# Patient Record
Sex: MALE | Employment: OTHER
[De-identification: names, ages, dates, MRNs, and addresses within clinical notes are randomized per-mention and may not be internally consistent; named-entity substitution may affect disease eponyms.]

---

## 2017-02-10 ENCOUNTER — FOLLOW UP (OUTPATIENT)
Age: 82
End: 2017-02-10

## 2017-02-13 ASSESSMENT — VISUAL ACUITY
OS_SC: 20/100-2
OD_SC: 20/30+2

## 2017-02-13 ASSESSMENT — TONOMETRY: OS_IOP_MMHG: 9

## 2017-05-12 ENCOUNTER — FOLLOW UP (OUTPATIENT)
Age: 82
End: 2017-05-12

## 2017-05-18 ASSESSMENT — VISUAL ACUITY
OS_SC: 20/100-1
OD_SC: 20/30-1

## 2017-05-18 ASSESSMENT — TONOMETRY
OS_IOP_MMHG: 12
OD_IOP_MMHG: 13

## 2017-08-18 ENCOUNTER — FOLLOW UP (OUTPATIENT)
Age: 82
End: 2017-08-18

## 2017-08-21 ASSESSMENT — TONOMETRY
OD_IOP_MMHG: 9
OS_IOP_MMHG: 8

## 2017-08-21 ASSESSMENT — VISUAL ACUITY
OD_SC: 20/30-2
OS_SC: 20/400

## 2017-11-10 ENCOUNTER — FOLLOW UP (OUTPATIENT)
Age: 82
End: 2017-11-10

## 2017-11-13 ASSESSMENT — VISUAL ACUITY
OD_SC: 20/40+2
OS_SC: 20/400

## 2017-11-13 ASSESSMENT — TONOMETRY
OS_IOP_MMHG: 17
OD_IOP_MMHG: 16

## 2018-03-16 ENCOUNTER — FOLLOW UP (OUTPATIENT)
Age: 83
End: 2018-03-16

## 2018-03-19 ASSESSMENT — VISUAL ACUITY
OS_SC: 20/400
OD_SC: 20/40
OD_CC: 20/30-1

## 2018-03-19 ASSESSMENT — TONOMETRY
OS_IOP_MMHG: 12
OD_IOP_MMHG: 15

## 2018-08-17 ENCOUNTER — FOLLOW UP (OUTPATIENT)
Age: 83
End: 2018-08-17

## 2018-08-20 ASSESSMENT — VISUAL ACUITY
OD_SC: 20/30-1
OS_SC: 20/400

## 2018-08-20 ASSESSMENT — TONOMETRY
OS_IOP_MMHG: 12
OD_IOP_MMHG: 10

## 2018-12-14 ENCOUNTER — FOLLOW UP (OUTPATIENT)
Age: 83
End: 2018-12-14

## 2018-12-17 ASSESSMENT — TONOMETRY
OD_IOP_MMHG: 14
OS_IOP_MMHG: 14

## 2018-12-17 ASSESSMENT — VISUAL ACUITY: OD_SC: 20/30

## 2019-05-31 ENCOUNTER — FOLLOW UP (OUTPATIENT)
Age: 84
End: 2019-05-31

## 2019-06-03 ASSESSMENT — TONOMETRY
OD_IOP_MMHG: 12
OS_IOP_MMHG: 14

## 2019-06-03 ASSESSMENT — VISUAL ACUITY
OD_SC: 20/30
OS_SC: 20/400

## 2019-11-15 ENCOUNTER — FOLLOW UP (OUTPATIENT)
Age: 84
End: 2019-11-15

## 2019-11-18 ASSESSMENT — VISUAL ACUITY
OD_SC: 20/30-1
OS_SC: 20/400

## 2019-11-18 ASSESSMENT — TONOMETRY
OS_IOP_MMHG: 12
OD_IOP_MMHG: 10
